# Patient Record
Sex: FEMALE | URBAN - METROPOLITAN AREA
[De-identification: names, ages, dates, MRNs, and addresses within clinical notes are randomized per-mention and may not be internally consistent; named-entity substitution may affect disease eponyms.]

---

## 2021-11-18 ENCOUNTER — OFFICE VISIT (OUTPATIENT)
Dept: URBAN - METROPOLITAN AREA CLINIC 6 | Facility: CLINIC | Age: 56
End: 2021-11-18

## 2021-11-18 DIAGNOSIS — H02.884 MEIBOMIAN GLAND DYSFUNCTION LEFT UPPER EYELID: ICD-10-CM

## 2021-11-18 DIAGNOSIS — H02.881 MEIBOMIAN GLAND DYSFUNCTION RIGHT UPPER EYELID: ICD-10-CM

## 2021-11-18 DIAGNOSIS — H57.11 OCULAR PAIN, RIGHT EYE: Primary | ICD-10-CM

## 2021-11-18 DIAGNOSIS — H11.041 PERIPHERAL PTERYGIUM, STATIONARY, RIGHT EYE: ICD-10-CM

## 2021-11-18 PROCEDURE — 92002 INTRM OPH EXAM NEW PATIENT: CPT | Performed by: OPTOMETRIST

## 2021-11-18 ASSESSMENT — INTRAOCULAR PRESSURE
OS: 12
OD: 10

## 2021-11-18 NOTE — IMPRESSION/PLAN
Impression: Meibomian gland dysfunction left upper eyelid: H02.884. Plan: Start warm compressed twice daily. Handout given.
Impression: Meibomian gland dysfunction right upper eyelid: H02.881.  Plan: same as 3
Impression: Ocular pain, right eye: H57.11. Plan: Dr Esperanza Michaels suspected punctal plug was hurting patient and was removed today. Recommend frequent lubrication. Follow up in 2 weeks.
Impression: Peripheral pterygium, stationary, right eye: H11.041. Plan: Inflamed start Itz BID OD (shake well) sample given.
Statement Selected